# Patient Record
Sex: FEMALE | Race: WHITE
[De-identification: names, ages, dates, MRNs, and addresses within clinical notes are randomized per-mention and may not be internally consistent; named-entity substitution may affect disease eponyms.]

---

## 2019-11-26 ENCOUNTER — HOSPITAL ENCOUNTER (OUTPATIENT)
Dept: HOSPITAL 35 - PAIN | Age: 84
End: 2019-11-26
Attending: ANESTHESIOLOGY
Payer: COMMERCIAL

## 2019-11-26 VITALS — BODY MASS INDEX: 14.6 KG/M2 | HEIGHT: 62.99 IN | WEIGHT: 82.4 LBS

## 2019-11-26 VITALS — SYSTOLIC BLOOD PRESSURE: 104 MMHG | DIASTOLIC BLOOD PRESSURE: 63 MMHG

## 2019-11-26 DIAGNOSIS — M81.0: ICD-10-CM

## 2019-11-26 DIAGNOSIS — M48.56XA: Primary | ICD-10-CM

## 2019-11-26 DIAGNOSIS — M48.062: ICD-10-CM

## 2019-11-26 DIAGNOSIS — G89.4: ICD-10-CM

## 2019-11-26 NOTE — NUR
Pain Clinic Assessment:
 
1. History of Osteoarthritis:
HANDS
   History of Rheumatoid Arthritis:
NONE
 
2. Height: 5 ft. 3 in. 160.0 cm.
   Weight: 82.4 lb.  oz. 37.376 kg.
   Patient's BMI: 14.6
 
3. Vital Signs:
   BP: 104/63 Pulse: 102 Resp: 16
   Temp:  02 Sat: 96 ECG Mon:
 
4. Pain Intensity: 0 SITTING
 
5. Fall Risk:
   Dizziness: N  Needs help standing or walking: Y
   Fallen in the last 3 months: N
   Fall risk comments:
 
 
6. Patient on Blood Thinner: None
 
7. History of Hypertension: N
 
8. Opioid Therapy greater than 6 weeks: N
   Opiate Contract Signed:
 
9. Risk Assessment Tool Provided: LOW RISK 0/3
 
10. Functional Assessment Tool: 44/70
 
11. Recreational Drug Use: Never Drug Type:
    Tobacco Use: Never Smoker Tobacco Type:
       Amount or Packs/day:  How Many Years:
    Alcohol Use: No  Frequency:  Quant:

## 2019-11-27 NOTE — HPC
[facility]
[place]   [address]                     PAIN MANAGEMENT CONSULTATION  
_______________________________________________________________________________
 
Name:       LEONCIO COLEMAN                  Room #:                     REG SALLIE TORRES#:      [id number]                       Account #:      [id number]  
Admission:  11/26/19    Attend Phys:    Saravanan Lincoln DO  
Discharge:              Date of Birth:  [birth year]  
                                                          Report #: [id number]
                                                                    [id number]   
_______________________________________________________________________________
THIS REPORT FOR:   //name//                          
 
CC: [facility] physician/PCP
    Saravanan Edwards 
 
DATE OF SERVICE:  11/26/2019
 
 
CHIEF COMPLAINT:  Low back pain and lower extremity pain.
 
HISTORY OF PRESENT ILLNESS:  As you know, the patient is a very pleasant
85-year-old female referred to our clinic for back pain that began spontaneously
10/08/2019.  The patient states that she was in a forward flexed position when
she sneezed, felt instantaneous low back pain, which ultimately took the patient
to the Emergency Department.  She was seen at [facility] for this
back pain.  She underwent imaging, which showed an L4 compression fracture and
was established an outpatient appointment with one of the interventional
radiologists to discuss the possibility of undergoing vertebral augmentation
procedure.  The patient states that she was evaluated by a physician with what
she believes was Interventional Radiology and she was advised she is not a
candidate for augmentation due to the posterior displaced fragment causing mild
canal stenosis.  She was then advised to follow up with her PCP.  She was not
offered any TLSO bracing or treatment at that visit, advised that there was
nothing there could be done from an interventional standpoint.  She followed up
with her PCP who then referred the patient to our clinic to discuss options for
treatment for the L4 compression fracture with mild retropulsion of the
posterior fragment.
 
The patient indicates pain is constant with intermittent brief and momentary
exacerbations of symptoms.  She describes the pain as shooting, sharp and
stabbing, places current pain score at 0-10/10 depending on activity, daily
average at 5/10, worst pain has been  10/10.  The patient states that standing
upright or putting any pressure on her leg exacerbate symptoms, sitting improves
pain.  She has been referred to our service to discuss treatment options for a
spontaneous vertebral compression fracture at L4.
 
PAST MEDICAL HISTORY:
1.  Diabetes mellitus type 2.
2.  Osteoporosis.
3.  Depression.
4.  Anxiety disorder.
5.  Hypertension.
6.  Diabetes mellitus type 2.
7.  Insomnia.
 
 
 
 
[address]   [address]                     PAIN MANAGEMENT CONSULTATION  
_______________________________________________________________________________
 
Name:       LEONCIO COLEMAN                  [facility] #[facility]                     [facility]      [id number]                       Account #:      [id number]  
Admission:  11/26/19    Attend Phys:    Saravanan Lincoln DO  
Discharge:              Date of Birth:  [birth year]  
                                                          Report #: [id number]
                                                                    [id number]   
_______________________________________________________________________________
PAST SURGICAL HISTORY:  None reported.
 
SOCIAL HISTORY:  The patient denies tobacco, alcohol, IV or illicit drug use. 
She is retired, retired years ago.  She is accompanied by her  and
daughter present in room today.  She is not receiving workmen's compensation or
is trying to obtain disability benefit.  She is not in litigation in regards to
pain.
 
REVIEW OF SYSTEMS:  Positive for decrease in appetite, fatigue and weakness,
wearing corrective eyewear, shortness of breath with walking or lying flat,
hypertension, depression, diabetes mellitus type 2, slow to heal after cuts,
bleeding and bruising tendencies, osteoporosis.  All other review of systems
negative per 12-point review of systems other than those listed in history of
present illness.
 
PAIN IMPACT SCORE:  47/70 indicating severe interference of daily activities
secondary to pain.
 
ALLERGIES:  MORPHINE, BACTRIM, AMOXICILLIN.
 
CURRENT MEDICATIONS:  Temazepam 15 mg once a day, alendronate 70 mg per week,
metformin 500 mg once a day, lisinopril 2.5 mg once a day, Anoro Ellipta 62.5/25
mcg inhaled once a day, gabapentin 100 mg 3 times a day, alprazolam 0.5 mg once
a day, hydrocodone 5/325 one tab b.i.d. p.r.n., prednisone 5 mg once a day,
sertraline 100 mg once a day.
 
IMAGING:  CT of the lumbar spine obtained 11/08/2019 shows moderate 40% acute
compression fracture of the L4 vertebral body with posterior bony retropulsion
resulting in mild central canal stenosis.  There is mild 30%, chronic appearing
compression deformity of L3  similar to x-rays of 02/28/2019.  There is advanced
degenerative changes of the lumbar spine, severe canal narrowing at L3-L4,
moderate to severe central canal narrowing at L2-L3 and L4-L5, moderate to
severe foraminal narrowing at the L5-S1 level.  There is noted generalized bony
demineralization, chronic pancreatitis, advanced diffuse calcific
atherosclerosis also noted.
 
PQRS:  The patient has known arthritic changes of the lumbar spine, bilateral
hands.  No rheumatoid arthritis.  She is placing pain score today anywhere from
9-10/10 depending on activity.  She is a fall risk, has not had a fall in last 3
months.  She is utilizing a roller walker for ambulation.  She is not on blood
thinners, but is treated for hypertension.  She is on opioids, but not greater
than 6 weeks.  She has a low opiate addiction potential.  Pain impact score
remains 47/70.
 
PHYSICAL EXAMINATION:
VITAL SIGNS:  Blood pressure 109/63, pulse is 102, respiratory rate 16 and
 
 
 
[facility]
[address] [place]   [address]                     PAIN MANAGEMENT CONSULTATION  
_______________________________________________________________________________
 
Name:       LEONCIO COLEMAN                  Room #:                     REG SALLIE TORRES#:      [id number]                       Account #:      [id number]  
Admission:  11/26/19    Attend Phys:    Saravanan Lincoln DO  
Discharge:              Date of Birth:  [birth year]  
                                                          Report #: [id number]
                                                                    [id number]   
_______________________________________________________________________________
unlabored.  The patient is 96% on room air.  Height 5 feet 3 inches tall, weight
82.4 pounds, BMI calculated 14.3.
GENERAL:  Well-developed, well-nourished, well-hydrated, thin, 85-year-old
female, appears her stated age.  She is in no acute distress, awake, alert and
oriented x 3.  Current pain score anywhere from 0-10/10 depending on activity.
HEENT:  Normocephalic, atraumatic.  Pupils equal, round, reactive to light.
NEUROLOGIC:  Speech is fluent.  The patient deemed a fair historian.
LUNGS:  Clear, no wheeze, rhonchi or rales.
CARDIOVASCULAR:  Tachycardic.  No gallop, no rub.
ABDOMEN:  Soft, nontender, normal active bowel sounds.
EXTREMITIES:  Show no clubbing, no cyanosis, and no edema.
MUSCULOSKELETAL:  There is some palpatory tenderness noted over the paraspinal
musculature of lower lumbar spine.  No spinous process tenderness.  There are no
changes in skin color or texture overlying the lumbar spine.  No ecchymosis. 
Lumbar provocation testing is met with increasing pain consistent with an L4
compression fracture that remains acute.  Seated straight leg raising negative. 
Supine straight leg raising mildly positive on the right.  Tommy's test
negative.  Modified Gaenslen's positive for axial low back pain.  Ankle clonus
negative.  Babinski is negative.
 
ASSESSMENT:
1.  L4 compression fracture.
2.  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
3.  Osteoporosis.
4.  Intractable pain.
 
PLAN:
1.  The patient has been referred to our service by her primary care physician
to discuss options for treatment given the findings of her recent CT
examination.  Treatment options for L4 compression fracture are as follows:  We
also discussed our concerns about the possibility of undergoing a lumbar
epidural injection and we do not recommend this in a patient with an acute L4
compression fracture and known osteoporosis.  This would be a significant
contraindication.  We discussed other options with the patient today, the
following was discussed.
2.  We discussed physical therapy along with medication management to maintain
benefit and strength in the lumbar spine and pain medications to control pain
with the fracture she currently has.  We would not recommend this option, but
did provide this as a potential treatment option as has been done in the past. 
We also discussed TLSO bracing with the Fontacto bracing system, the most
tolerable of the bracing systems in our opinion.  This in conjunction with
medication management would offload the fracture weight allowing the fracture to
heal in position with a 40% reduction in anterior wedging.  Augmentation of the
vertebral area, I agree with the previous physician is not amenable to either
vertebroplasty or kyphoplasty given the posterior fracture with positioning
within the central canal.  This would preclude any augmentation procedures. 
 
 
 
[address]   [address]                     PAIN MANAGEMENT CONSULTATION  
_______________________________________________________________________________
 
Name:       LEONCIO COLEMAN                  Room #:                     REG CLGISELLE TORRES#:      [id number]                       Account #:      [id number]  
Admission:  11/26/19    Attend Phys:    Saravanan Lincoln DO  
Discharge:              Date of Birth:  [birth year]  
                                                          Report #: [id number]
                                                                    [id number]   
_______________________________________________________________________________
After discussing the treatment options, the patient requested to begin with the
TLSO bracing system and medication management.
3.  The patient will be sent for TLSO bracing.  [facility]
they can size the patient today.  We have taken the liberty of making the
patient an appointment.  She will go there from here today and be fitted for the
device for the L4 compression fracture.  The patient will need to wear this for
at least 6 weeks.  She will have to wear this while sitting and standing, but
can remove it in a lying position.
4.  We have taken the liberty of refilling Dr. Edwards's hydrocodone dosing.  She
can follow up with her PCP to continue this therapy.  We have given the patient
hydrocodone 5/325 one tab p.o. b.i.d.  I have given the patient #60 tablets, no
refills.  The patient will watch for side effects of somnolence, decreased
mental acuity, disorientation, confusion, mental slowing or constipation with
use of the medication.  If she notes any side effects, contact her PCP.
5.  Given the patient's osteoporosis, her current dosing of prednisone and
spontaneous compression fracture not only at the L4 level, which is acute, but
the chronic L3 compression, she is not a candidate for epidural injections.  The
epidural injections as you are aware do provide some analgesic benefit, but also
have a significant effect upon osteoblast activity and osteoclastic activity
leading to further degradation of the vertebral structures.  We would not
recommend in this patient undergoing epidural injections unless there is no
other option available and in this case TLSO bracing is the appropriate option. 
Vertebral compression fractures do not show any improvement in symptoms with
epidural steroid injections as the injections are well beyond the vertebral body
and do not provide any benefit from bony abnormalities.
6.  We will see the patient back in followup visit in about 3-4 weeks.  At that
point, we will have the patient undergo x-ray imaging to determine if there has
been some corticalization of the L4 compression.  If there has been some
corticalization will be returning her care at that point to the PCP to continue
the TLSO bracing until full corticalization has been completed and then she can
come out of the TLSO bracing system.  We will see her back in 3 weeks for
further evaluation.
7.  We wish to thank Dr. Edwards for the opportunity to see the patient in
consultation.  We will keep you apprised of her response to treatment as we
address this L4 compression fracture with conservative TLSO bracing utilizing
the Jacinda system.  Again, we wish to thank you for the opportunity to see the
patient in consultation.
 
 
 
 
 
 
 
  <ELECTRONICALLY SIGNED>
   By: Saravanan Lincoln DO          
  11/27/19     0747
D: 11/26/19 1640                           _____________________________________
T: 11/26/19 2350                           Saravanan Lincoln DO            /nt

## 2019-12-17 ENCOUNTER — HOSPITAL ENCOUNTER (OUTPATIENT)
Dept: HOSPITAL 35 - PAIN | Age: 84
Discharge: HOME | End: 2019-12-17
Attending: ANESTHESIOLOGY
Payer: COMMERCIAL

## 2019-12-17 VITALS — HEIGHT: 62.99 IN | BODY MASS INDEX: 16.05 KG/M2 | WEIGHT: 90.6 LBS

## 2019-12-17 VITALS — DIASTOLIC BLOOD PRESSURE: 68 MMHG | SYSTOLIC BLOOD PRESSURE: 127 MMHG

## 2019-12-17 DIAGNOSIS — M48.061: ICD-10-CM

## 2019-12-17 DIAGNOSIS — Z88.8: ICD-10-CM

## 2019-12-17 DIAGNOSIS — I10: ICD-10-CM

## 2019-12-17 DIAGNOSIS — Z79.899: ICD-10-CM

## 2019-12-17 DIAGNOSIS — S32.048A: ICD-10-CM

## 2019-12-17 DIAGNOSIS — M16.11: ICD-10-CM

## 2019-12-17 DIAGNOSIS — M25.551: Primary | ICD-10-CM
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
## 2019-12-17 DIAGNOSIS — M81.0: ICD-10-CM

## 2019-12-17 DIAGNOSIS — M19.90: ICD-10-CM
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
## 2019-12-17 DIAGNOSIS — Z98.890: ICD-10-CM

## 2019-12-17 DIAGNOSIS — G89.29: ICD-10-CM
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
## 2019-12-17 DIAGNOSIS — Z79.891: ICD-10-CM

## 2019-12-17 NOTE — HPC
[facility]
[address] [place]   [address]                     PAIN MANAGEMENT CONSULTATION  
_______________________________________________________________________________
 
Name:       LEONCIO COLEMAN                  Room #:                     REG SALLIE ACEVESRegan#:      [id number]                       Account #:      [id number]  
Admission:  12/17/19    Attend Phys:    Saravanan Lincoln DO  
Discharge:              Date of Birth:  [birth year]  
                                                          Report #: [id number]
                                                                    [id number]   
_______________________________________________________________________________
THIS REPORT FOR:   //name//                          
 
CC: FAM physician/PCP
    Saravanan Lincoln
 
DATE OF SERVICE:  12/17/2019
 
 
REFERRING PHYSICIAN:  Dr. Sissy Edwards.
 
CHIEF COMPLAINT:  Low back pain, lower extremity pain with paresthesias.
 
HISTORY OF PRESENT ILLNESS:  As you know, the patient is a very pleasant
85-year-old female referred to our service for chronic low back pain that began
spontaneously on 11/08/2019.  She was in a forward flexed position when she
sneezed, felt instantaneous back pain.  She sought evaluation through [facility] and found to have a compression fracture of L4 with
retropulsion, which made it impossible for interventional treatment such as
kyphoplasty or vertebroplasty.  The patient was subsequently referred to our
clinic as they could not be seen in the [facility].  We saw the
patient in consultation and advised the patient the TLSO bracing is the only
alternative treatment course for these fractures other than tincture of time and
medication management.  We opted for the TLSO bracing and medication therapy. 
She returns today in followup visit continuing to experience pain, but utilizing
the TLSO brace.  Unfortunately, the brace is not fitting appropriately.  She is
placing pain today at 9/10, exacerbated with any activities.  She does not have
any specific tenderness over the lumbar spine noted today, though pain is
elicited with trying to stand from a seated position.  There has been no new
injury or trauma, but I am concerned that she may have experienced further
fracturing of the L4 and possibly even one of either the L2 or L3 may have
spontaneously fractured and I think further evaluation is necessary.
 
ALLERGIES:  MORPHINE, BACTRIM, AMOXICILLIN.
 
CURRENT MEDICATIONS:  Hydrocodone, temazepam, alendronate, metformin,
lisinopril, Anoro Ellipta, gabapentin, alprazolam, prednisone, sertraline.
 
SOCIAL HISTORY:  The patient denies tobacco, alcohol, IV or illicit drug use. 
She is retired, retired years ago, accompanied by her  present in room
today.
 
IMAGING:  There is no new imaging available.
 
PQRS:  The patient has known arthritic changes of the lumbar spine, bilateral
hands.  No rheumatoid arthritis.  She is placing pain intensity today at 9/10. 
She is a fall risk, but has not had a fall in the last 3 months.  She is not on
 
 
 
[place] [address]                     PAIN MANAGEMENT CONSULTATION  
_______________________________________________________________________________
 
Name:       LEONCIO COLEMAN                  Room #:                     REG GISELLE TORRES#:      [id number]                       Account #:      [id number]  
Admission:  12/17/19    Attend Phys:    Saravanan Lincoln DO  
Discharge:              Date of Birth:  [birth year]  
                                                          Report #: [id number]
                                                                    [id number]   
_______________________________________________________________________________
blood thinners.  She is hypertensive.  She is on no chronic opioids, but does
have a low opioid addiction potential.  Pain impact score 44/70 indicating
moderate-to-severe interference of daily activities secondary to pain.
 
PHYSICAL EXAMINATION:
VITAL SIGNS:  Blood pressure 127/68, pulse 89, respiratory rate 18 and
unlabored.  The patient is 95% on room air.  Height 5 feet 3 inches tall, weight
90.6 pounds, BMI calculated 16.1.
GENERAL:  Thin 85-year-old female appearing stated age, pain is rated today up
to 9/10.
HEENT:  Normocephalic, atraumatic.  Pupils equal, round, reactive to light.
EXTREMITIES:  Show no clubbing, no cyanosis, no edema.
MUSCULOSKELETAL:  The patient remains tender to palpation over the paraspinal
musculature of lower lumbar spine.  No spinous process tenderness.  No
ecchymosis overlying the lower lumbar area.  The patient is in a TLSO brace, but
is not fitting appropriately.  Seated straight leg raising negative.  Supine
straight leg raising is positive only for axial back pain.  There is some
radiation of symptoms to the right buttock, but not in classic dermatomal
distribution.  Lumbar provocation testing is met with increasing pain as is
standing from a seated position.
 
ASSESSMENT:
1.  L4 compression fracture (spontaneous in its development).
2.  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
3.  Possible vertebral compression fracture of L3.
4.  Osteoporosis.
5.  Chronic intractable pain.
 
PLAN:
1.  The patient returns today in followup visit indicating that the TLSO brace
has provided some benefit, but unfortunately it is not fitted appropriately, she
needs to have this adjusted rapidly.  I do not think this TLSO brace is
providing much in the way of protection.  Adjustments need to be made in this
TLSO bracing.  We advised the patient to contact the equipment company she
received this device from and have adjustments made.  I believe that was [employer]
here in [place].  The patient was given the information to contact them
for further adjustments.
2.  The patient will undergo x-ray imaging of the right hip and the low back. 
The patient is complaining of right hip pain, though I believe this is more
related to the patient's L4 compression fracture, I cannot rule out a possible
occult fracture of the hip given the spontaneous nature of the fracture in the
lumbar spine and her severe osteoporosis.  X-ray of the hip will be obtained as
quickly as possible.
3.  We have requested the patient undergo x-ray imaging of the lumbar spine to
further evaluate the L4 fracture.  I am hopeful the patient will have shown some
healing of the L4 area and she can begin to come out of the TLSO brace.  Further
 
 
 
[facility]
[address]
[place]   [address]                     PAIN MANAGEMENT CONSULTATION  
_______________________________________________________________________________
 
Name:       LEONCIO COLEMAN                  Room #:                     REG [facility]..#:      [id number]                       Account #:      [id number]  
Admission:  12/17/19    Attend Phys:    Saravanan Lincoln DO  
Discharge:              Date of Birth:  [birth year]  
                                                          Report #: [id number]
                                                                    [id number]   
_______________________________________________________________________________
evaluation is necessary before she can do so.  If there is healing noted, we
will contact the patient and advised her of the timing of the TLSO bracing
removal.
4.  I do have suspicion of possible vertebral compression fracture at L3. 
Previous imaging showed what appears to be a chronic L3 compression fracture, I
wish to further evaluate this.  The patient will undergo the x-ray imaging as
indicated above for this evaluation.  We will contact the patient what is the
findings.
5.  Due to the side effects of medications, it is difficult to dose this patient
appropriately to relieve pain.  We have agreed to give the patient Norco 5/325
to continue pain control.  She was given a prescription today, but prior to
leaving, the patient returned the prescription stating she did not need the
medication as she had some at home.  We recommended she continue this therapy.
6.  The patient and I did discuss the possibility of treating her right hip
pain.  She is convinced that the symptoms she is experiencing currently are from
the right hip.  She does have known arthritic changes in the hip, which could be
contributing to her symptoms as her gait has completely been altered with a TLSO
brace in this fracture.  We have agreed to provide the patient with an
intra-articular hip injection today.  We have advised the patient of the risks
and benefits of the procedure.  These risks include, but are not necessarily
limited to bleeding, bruising, infection, worsening pain, no relief of pain,
also risk of temporary or permanent muscle weakness, temporary or permanent
nerve damage, possible paralysis and death.  The patient states understood and
wished to proceed.
7.  The patient will return to our clinic on an as needed basis.  We will keep
you apprised of the patient's findings on x-ray imaging.
 
PROCEDURE:  Right intraarticular hip injection under fluoroscopic guidance.
 
DESCRIPTION OF PROCEDURE:  After obtaining written consent, the patient was
taken back to fluoroscopy suite, placed in a supine position.  Image intensifier
was brought into position over the patient's right hip and x-ray imaging was
obtained.  Imaging confirmed osteoarthritic changes of the hip and degenerative
changes of the joint.  We marked the area with a marker and sterilely prepped
the area with chlorhexidine.  A 27-gauge 1-1/4 inch needle was then used to
anesthetize skin and subcutaneous tissue.
 
A 22-gauge 3-1/2 inch spinal needle was advanced under fluoroscopic guidance
towards the right hip.  Image did confirm the needle positioning was
appropriate.  After negative aspiration for heme, 0.5 mL of Omnipaque was
injected demonstrating excellent right hip arthrogram.  After negative
aspiration for heme, 3 mL of a solution containing 1 mL, 40 mg per mL, 40 mg
total triamcinolone along with bupivacaine 0.5% injected slowly.  Needle
retracted longterm, flushed with 1 mL of 1% lidocaine and then removed.  Sterile
bandage placed over injection site.  There were no new motor deficits present in
the lower extremities following procedure.
 
 
 
[facility]
[address] [place]   [address]                     PAIN MANAGEMENT CONSULTATION  
_______________________________________________________________________________
 
Name:       LEONCIO COLEMAN                  Room #:                     REG SALLIE TORRES#:      [id number]                       Account #:      [id number]  
Admission:  12/17/19    Attend Phys:    Saravanan Lincoln DO  
Discharge:              Date of Birth:  [birth year]  
                                                          Report #: [id number]
                                                                    [id number]   
_______________________________________________________________________________
 
The patient tolerated procedure well, carefully escorted to recovery room in
stable condition.  No apparent complications.  After meeting discharge criteria,
the patient discharged home.
 
 
 
 
 
 
 
 
 
 
 
 
 
 
 
 
 
 
 
 
 
 
 
 
 
 
 
 
 
 
 
 
 
 
 
 
 
 
 
 
                         
   By:                               
                   
D: 12/22/19 1614                           _____________________________________
T: 12/22/19 2135                           Saravanan Lincoln DO            /nt

## 2019-12-17 NOTE — NUR
Pain Clinic Assessment:
 
1. History of Osteoarthritis:
HANDS
SPINE
   History of Rheumatoid Arthritis:
NONE
 
2. Height: 5 ft. 3 in. 160.0 cm.
   Weight: 90.6 lb.  oz. 41.096 kg.
   Patient's BMI: 16.1
 
3. Vital Signs:
   BP: 127/68 Pulse: 89 Resp: 18
   Temp:  02 Sat: 95 ECG Mon:
 
4. Pain Intensity: 9 STANDING, 0 SITTING
 
5. Fall Risk:
   Dizziness: N  Needs help standing or walking: Y
   Fallen in the last 3 months: N
   Fall risk comments:
 
 
6. Patient on Blood Thinner: None
 
7. History of Hypertension: N
 
8. Opioid Therapy greater than 6 weeks: N
   Opiate Contract Signed:
 
9. Risk Assessment Tool Provided: LOW RISK 0/3
 
10. Functional Assessment Tool: 44/70
 
11. Recreational Drug Use: Never Drug Type:
    Tobacco Use: Never Smoker Tobacco Type:
       Amount or Packs/day:  How Many Years:
    Alcohol Use: No  Frequency:  Quant: